# Patient Record
(demographics unavailable — no encounter records)

---

## 2020-10-14 NOTE — EDM.PDOC
ED HPI GENERAL MEDICAL PROBLEM





- General


Stated Complaint: CUT BY EYEBROW


Time Seen by Provider: 10/14/20 22:20


Source of Information: Reports: Patient


History Limitations: Reports: No Limitations





- History of Present Illness


INITIAL COMMENTS - FREE TEXT/NARRATIVE: 





Patient presented to the ED because of an eye brow laceration. He was playing 

basketball and butt head against the head of another player.





- Related Data


                                    Allergies











Allergy/AdvReac Type Severity Reaction Status Date / Time


 


No Known Allergies Allergy   Verified 10/14/20 22:33











Home Meds: 


                                    Home Meds





NK [No Known Home Meds]  10/14/20 [History]











ED ROS GENERAL





- Review of Systems


Review Of Systems: See Below


Constitutional: Reports: No Symptoms


HEENT: Reports: No Symptoms


Respiratory: Reports: No Symptoms


Cardiovascular: Reports: No Symptoms


Endocrine: Reports: No Symptoms


GI/Abdominal: Reports: No Symptoms


: Reports: No Symptoms


Musculoskeletal: Reports: No Symptoms


Skin: Reports: Wound


Neurological: Reports: No Symptoms


Psychiatric: Reports: No Symptoms





ED EXAM, SKIN/RASH


Exam: See Below


Exam Limited By: No Limitations


General Appearance: Alert, No Apparent Distress


Eye Exam: Bilateral Eye: PERRL


Ears: Normal External Exam, Normal Canal


Nose: Normal Inspection, Normal Mucosa


Throat/Mouth: Normal Inspection, Normal Lips, Normal Teeth


Head: Atraumatic, Normocephalic


Respiratory/Chest: No Respiratory Distress, Lungs Clear, Normal Breath Sounds


Cardiovascular: Normal Peripheral Pulses, Regular Rate, Rhythm, No Edema, No 

Gallop


GI/Abdominal: Normal Bowel Sounds, Soft, Non-Tender, No Organomegaly


Back Exam: Normal Inspection, Full Range of Motion


Extremities: Normal Inspection, Normal Range of Motion, Non-Tender, No Pedal 

Edema, Normal Capillary Refill


Neurological: Alert, Oriented, CN II-XII Intact, Normal Cognition, Normal Gait


Psychiatric: Normal Affect, Normal Mood





ED SKIN PROCEDURES





- Laceration/Wound Repair


  ** Right Forehead


Appearance: Superficial, Clean


Skin Prep: Chlorhexidine (Hibiciens)


Closed with: Dermabond


Lac/Wound length In cm: 3





Course





- Vital Signs


Text/Narrative:: 





UTD with immunization





Departure





- Departure


Time of Disposition: 22:30


Disposition: Home, Self-Care 01


Condition: Good


Clinical Impression: 


 Laceration








- Discharge Information


Instructions:  Facial Laceration, Easy-to-Read


Referrals: 


PCP,None [Primary Care Provider] - 


Additional Instructions: 


Pleas read discharge instructions on laceration


No need to apply an antibiotic ointment, the glue is medicated


Do not remove the steri-strips, it will eventually fall off


Keep the wound dry for at least 3-5 days


Follow up as neede